# Patient Record
Sex: MALE | Race: WHITE | Employment: UNEMPLOYED | URBAN - METROPOLITAN AREA
[De-identification: names, ages, dates, MRNs, and addresses within clinical notes are randomized per-mention and may not be internally consistent; named-entity substitution may affect disease eponyms.]

---

## 2022-06-30 ENCOUNTER — APPOINTMENT (OUTPATIENT)
Dept: GENERAL RADIOLOGY | Age: 57
End: 2022-06-30

## 2022-06-30 ENCOUNTER — HOSPITAL ENCOUNTER (EMERGENCY)
Age: 57
Discharge: HOME | End: 2022-06-30
Attending: EMERGENCY MEDICINE

## 2022-06-30 VITALS
DIASTOLIC BLOOD PRESSURE: 74 MMHG | SYSTOLIC BLOOD PRESSURE: 121 MMHG | HEART RATE: 74 BPM | RESPIRATION RATE: 16 BRPM | OXYGEN SATURATION: 97 % | HEIGHT: 69 IN | BODY MASS INDEX: 19.26 KG/M2 | WEIGHT: 130 LBS | TEMPERATURE: 97.8 F

## 2022-06-30 DIAGNOSIS — I87.2 VENOUS STASIS DERMATITIS OF BOTH LOWER EXTREMITIES: ICD-10-CM

## 2022-06-30 DIAGNOSIS — R77.0 ABNORMALITY OF ALBUMIN: ICD-10-CM

## 2022-06-30 DIAGNOSIS — R60.0 LEG EDEMA: Primary | ICD-10-CM

## 2022-06-30 DIAGNOSIS — E87.70 HYPERVOLEMIA, UNSPECIFIED HYPERVOLEMIA TYPE: ICD-10-CM

## 2022-06-30 LAB
ALBUMIN SERPL-MCNC: 3.5 G/DL (ref 3.5–5)
ALBUMIN/GLOB SERPL: 0.8 {RATIO} (ref 1.2–3.5)
ALP SERPL-CCNC: 65 U/L (ref 50–136)
ALT SERPL-CCNC: 16 U/L (ref 12–65)
AMPHET UR QL SCN: POSITIVE
ANION GAP SERPL CALC-SCNC: 7 MMOL/L (ref 7–16)
APPEARANCE UR: CLEAR
AST SERPL-CCNC: 18 U/L (ref 15–37)
BENZODIAZ UR QL: NEGATIVE
BILIRUB SERPL-MCNC: 0.6 MG/DL (ref 0.2–1.1)
BILIRUB UR QL: NEGATIVE
BUN SERPL-MCNC: 19 MG/DL (ref 6–23)
CALCIUM SERPL-MCNC: 9 MG/DL (ref 8.3–10.4)
CANNABINOIDS UR QL SCN: NEGATIVE
CHLORIDE SERPL-SCNC: 102 MMOL/L (ref 98–107)
CO2 SERPL-SCNC: 27 MMOL/L (ref 21–32)
COCAINE UR QL SCN: NEGATIVE
COLOR UR: ABNORMAL
CREAT SERPL-MCNC: 0.8 MG/DL (ref 0.8–1.5)
ERYTHROCYTE [DISTWIDTH] IN BLOOD BY AUTOMATED COUNT: 12.5 % (ref 11.9–14.6)
GLOBULIN SER CALC-MCNC: 4.3 G/DL (ref 2.3–3.5)
GLUCOSE SERPL-MCNC: 93 MG/DL (ref 65–100)
GLUCOSE UR STRIP.AUTO-MCNC: NEGATIVE MG/DL
HCT VFR BLD AUTO: 34.5 % (ref 41.1–50.3)
HGB BLD-MCNC: 11.8 G/DL (ref 13.6–17.2)
HGB UR QL STRIP: NEGATIVE
KETONES UR QL STRIP.AUTO: ABNORMAL MG/DL
LEUKOCYTE ESTERASE UR QL STRIP.AUTO: NEGATIVE
MCH RBC QN AUTO: 28.9 PG (ref 26.1–32.9)
MCHC RBC AUTO-ENTMCNC: 34.2 G/DL (ref 31.4–35)
MCV RBC AUTO: 84.4 FL (ref 79.6–97.8)
NITRITE UR QL STRIP.AUTO: NEGATIVE
NRBC # BLD: 0 K/UL (ref 0–0.2)
NT PRO BNP: 74 PG/ML (ref 5–125)
OPIATES UR QL: NEGATIVE
PH UR STRIP: 5 [PH] (ref 5–9)
PLATELET # BLD AUTO: 242 K/UL (ref 150–450)
PMV BLD AUTO: 8.3 FL (ref 9.4–12.3)
POTASSIUM SERPL-SCNC: 3.4 MMOL/L (ref 3.5–5.1)
PROT SERPL-MCNC: 7.8 G/DL (ref 6.3–8.2)
PROT UR STRIP-MCNC: NEGATIVE MG/DL
RBC # BLD AUTO: 4.09 M/UL (ref 4.23–5.6)
SODIUM SERPL-SCNC: 136 MMOL/L (ref 138–145)
SP GR UR REFRACTOMETRY: 1.03 (ref 1–1.02)
UROBILINOGEN UR QL STRIP.AUTO: 1 EU/DL (ref 0.2–1)
WBC # BLD AUTO: 5 K/UL (ref 4.3–11.1)

## 2022-06-30 PROCEDURE — 83880 ASSAY OF NATRIURETIC PEPTIDE: CPT

## 2022-06-30 PROCEDURE — 80053 COMPREHEN METABOLIC PANEL: CPT

## 2022-06-30 PROCEDURE — 80307 DRUG TEST PRSMV CHEM ANLYZR: CPT

## 2022-06-30 PROCEDURE — 99285 EMERGENCY DEPT VISIT HI MDM: CPT

## 2022-06-30 PROCEDURE — 85027 COMPLETE CBC AUTOMATED: CPT

## 2022-06-30 PROCEDURE — 81003 URINALYSIS AUTO W/O SCOPE: CPT

## 2022-06-30 PROCEDURE — 71101 X-RAY EXAM UNILAT RIBS/CHEST: CPT

## 2022-06-30 RX ORDER — FUROSEMIDE 40 MG/1
40 TABLET ORAL DAILY
Qty: 10 TABLET | Refills: 0 | Status: SHIPPED | OUTPATIENT
Start: 2022-06-30 | End: 2022-07-10

## 2022-06-30 RX ORDER — CEPHALEXIN 500 MG/1
500 CAPSULE ORAL 2 TIMES DAILY
Qty: 14 CAPSULE | Refills: 0 | Status: SHIPPED | OUTPATIENT
Start: 2022-06-30 | End: 2022-07-07

## 2022-06-30 ASSESSMENT — ENCOUNTER SYMPTOMS
NAUSEA: 0
SHORTNESS OF BREATH: 1
PHOTOPHOBIA: 0
ABDOMINAL PAIN: 0
TROUBLE SWALLOWING: 0
BACK PAIN: 0
VOICE CHANGE: 0
BOWEL INCONTINENCE: 0
CHEST TIGHTNESS: 0
COLOR CHANGE: 1

## 2022-06-30 ASSESSMENT — PAIN DESCRIPTION - LOCATION: LOCATION: LEG

## 2022-06-30 ASSESSMENT — PAIN SCALES - GENERAL: PAINLEVEL_OUTOF10: 7

## 2022-06-30 ASSESSMENT — PAIN - FUNCTIONAL ASSESSMENT: PAIN_FUNCTIONAL_ASSESSMENT: 0-10

## 2022-06-30 NOTE — ED PROVIDER NOTES
[facility] Emergency Department Provider Note                   PCP:                No primary care provider on file. Age: 64 y.o. Sex: male     No diagnosis found. DISPOSITION         New Prescriptions    No medications on file       Orders Placed This Encounter   Procedures    XR RIBS RIGHT INCLUDE CHEST (MIN 3 VIEWS)    CBC    CMP    proBNP, N-TERMINAL    Drug Screen Psych, Urine    Urinalysis w rflx microscopic    EKG 12 Lead    Insert peripheral IV STAT        Mathieu Hernandez MD 5:57 PM      MDM  Number of Diagnoses or Management Options  Abnormality of albumin  Hypervolemia, unspecified hypervolemia type  Leg edema  Venous stasis dermatitis of both lower extremities  Diagnosis management comments: Disheveled in appearance. Does have some bruising and tenderness in the right flank right rib area. Will obtain chest x-ray. Leg swelling could be multifactorial.  Does report a history of possible cirrhosis as he was a drinker for 30 years. 7:48 PM  Labs here shows a sodium of 134, albumin of 0.8. I feel his symptoms are more like related to his liver issues. His x-ray shows rib fractures on the right side without any obvious lung injuries. He is not hypoxic here.            Amount and/or Complexity of Data Reviewed  Clinical lab tests: ordered and reviewed  Tests in the radiology section of CPT®: ordered and reviewed  Review and summarize past medical records: yes  Independent visualization of images, tracings, or specimens: yes (EKG interpretation: Sinus rhythm, rate of 75, LVH, normal axis, no acute ST segment changes)    Risk of Complications, Morbidity, and/or Mortality  Presenting problems: moderate  Diagnostic procedures: moderate  Management options: moderate    Patient Progress  Patient progress: stable       Anabel Man is a 64 y.o. male who presents to the Emergency Department with chief complaint of    Chief Complaint   Patient presents with    Leg Swelling Patient presents the ER with complaints of recurrent leg swelling. Patient states he has had issues with leg swelling for the past couple months. States \"no doctor can tell why. Reports he had a fall yesterday, landing on the right side of his ribs and reports pain in this area as well. Otherwise denies any chest pain or shortness of breath. Denies any fevers or chills. States he does not take any fluid pills. The history is provided by the patient. Fall  The accident occurred yesterday. The fall occurred while walking. He landed on grass. Point of impact: right chest and flank. The pain is at a severity of 3/10. The pain is mild. Pertinent negatives include no fever, no abdominal pain, no bowel incontinence, no nausea, no hearing loss and no loss of consciousness. Review of Systems   Constitutional: Negative for fatigue, fever and unexpected weight change. HENT: Negative for congestion, trouble swallowing and voice change. Eyes: Negative for photophobia. Respiratory: Positive for shortness of breath. Negative for chest tightness. Cardiovascular: Positive for leg swelling. Negative for chest pain. Gastrointestinal: Negative for abdominal pain, bowel incontinence and nausea. Endocrine: Negative for polydipsia, polyphagia and polyuria. Genitourinary: Negative for decreased urine volume and urgency. Musculoskeletal: Negative for back pain, gait problem, neck pain and neck stiffness. Skin: Positive for color change and wound. Neurological: Negative for tremors, loss of consciousness and weakness. Hematological: Negative for adenopathy. Does not bruise/bleed easily. Psychiatric/Behavioral: Negative for behavioral problems. All other systems reviewed and are negative. No past medical history on file. No past surgical history on file. No family history on file.         Social Connections:     Frequency of Communication with Friends and Family: Not on file    Behavior: Behavior normal.          Procedures      Labs Reviewed   CBC - Abnormal; Notable for the following components:       Result Value    RBC 4.09 (*)     Hemoglobin 11.8 (*)     Hematocrit 34.5 (*)     MPV 8.3 (*)     All other components within normal limits   COMPREHENSIVE METABOLIC PANEL - Abnormal; Notable for the following components:    Sodium 136 (*)     Potassium 3.4 (*)     Globulin 4.3 (*)     Albumin/Globulin Ratio 0.8 (*)     All other components within normal limits   PROBNP, N-TERMINAL   DRUG SCREEN PSYCH, URINE   URINALYSIS        XR RIBS RIGHT INCLUDE CHEST (MIN 3 VIEWS)    (Results Pending)                          Voice dictation software was used during the making of this note. This software is not perfect and grammatical and other typographical errors may be present. This note has not been completely proofread for errors.      Glendia Runner, MD  06/30/22 [place] MD Ap  06/30/22 [id number] [facility], MD  06/30/22 [id number]

## 2022-06-30 NOTE — ED TRIAGE NOTES
Patient to triage via wheelchair with CO BLE edema with weeping. Has been seen \"a bunch of times by a bunch of different drs and no one can tell me what it is\". denies SOB and CP.

## 2022-07-01 NOTE — ED NOTES
I have reviewed discharge instructions with the patient. The patient verbalized understanding. Patient left ED via Discharge Method: wheelchair to Home with (insert name of family/friend, self, transport self). Opportunity for questions and clarification provided. Patient given 2 scripts. To continue your aftercare when you leave the hospital, you may receive an automated call from our care team to check in on how you are doing. This is a free service and part of our promise to provide the best care and service to meet your aftercare needs.  If you have questions, or wish to unsubscribe from this service please call [phone]. Thank you for Choosing our [facility] Emergency Department.         Candice Guillory RN  06/30/22 2028